# Patient Record
Sex: FEMALE | Race: ASIAN | NOT HISPANIC OR LATINO | Employment: FULL TIME | URBAN - METROPOLITAN AREA
[De-identification: names, ages, dates, MRNs, and addresses within clinical notes are randomized per-mention and may not be internally consistent; named-entity substitution may affect disease eponyms.]

---

## 2020-06-01 ENCOUNTER — NURSE TRIAGE (OUTPATIENT)
Dept: NURSING | Facility: CLINIC | Age: 20
End: 2020-06-01

## 2020-06-01 NOTE — TELEPHONE ENCOUNTER
Patient calls stating she just moved to [place] and parents go to a [place] clinic.   She has been having problems with an itchy scalp that bleeds.   She notes white flaky chunks of skin that can be the size of a pin up to pencil eraser size.   Has not noticed any insects on scalp.   Itching is very bothersome and becomes painful.   Transferred to scheduling to make a virtual visit at [facility] per patient request.     GENEVIEVE Tyler RN      Additional Information    Negative: Sounds like a life-threatening emergency to the triager    Nursing judgment    Protocols used: NO PROTOCOL AVAILABLE - SICK ADULT-A-OH

## 2020-06-08 ENCOUNTER — OFFICE VISIT (OUTPATIENT)
Dept: FAMILY MEDICINE | Facility: CLINIC | Age: 20
End: 2020-06-08
Payer: COMMERCIAL

## 2020-06-08 VITALS
OXYGEN SATURATION: 98 % | HEART RATE: 95 BPM | HEIGHT: 64 IN | WEIGHT: 131 LBS | DIASTOLIC BLOOD PRESSURE: 63 MMHG | RESPIRATION RATE: 16 BRPM | BODY MASS INDEX: 22.36 KG/M2 | TEMPERATURE: 97.8 F | SYSTOLIC BLOOD PRESSURE: 102 MMHG

## 2020-06-08 DIAGNOSIS — Z30.09 COUNSELING FOR BIRTH CONTROL, ORAL CONTRACEPTIVES: ICD-10-CM

## 2020-06-08 DIAGNOSIS — L21.9 SEBORRHEIC DERMATITIS: Primary | ICD-10-CM

## 2020-06-08 DIAGNOSIS — L70.8 OTHER ACNE: ICD-10-CM

## 2020-06-08 LAB — HCG UR QL: NEGATIVE

## 2020-06-08 PROCEDURE — 99203 OFFICE O/P NEW LOW 30 MIN: CPT | Performed by: PHYSICIAN ASSISTANT

## 2020-06-08 PROCEDURE — 81025 URINE PREGNANCY TEST: CPT | Performed by: PHYSICIAN ASSISTANT

## 2020-06-08 RX ORDER — NORGESTIMATE AND ETHINYL ESTRADIOL 0.25-0.035
1 KIT ORAL DAILY
Qty: 84 TABLET | Refills: 3 | Status: SHIPPED | OUTPATIENT
Start: 2020-06-08 | End: 2021-06-11

## 2020-06-08 RX ORDER — CLOBETASOL PROPIONATE 0.5 MG/ML
SOLUTION TOPICAL DAILY
Qty: 50 ML | Refills: 1 | Status: SHIPPED | OUTPATIENT
Start: 2020-06-08 | End: 2022-01-04

## 2020-06-08 RX ORDER — KETOCONAZOLE 20 MG/ML
SHAMPOO TOPICAL DAILY PRN
Qty: 120 ML | Refills: 1 | Status: SHIPPED | OUTPATIENT
Start: 2020-06-08 | End: 2020-12-11

## 2020-06-08 ASSESSMENT — MIFFLIN-ST. JEOR: SCORE: 1346.27

## 2020-06-08 NOTE — PATIENT INSTRUCTIONS
Start birth control after period ends.    Use shampoo when you wash hair and solution once daily until itching/flakey is better.  Patient Education     Understanding Seborrheic Dermatitis  Seborrheic dermatitis is a common type of rash that causes red, scaly, greasy skin. It occurs on skin that has oil glands. These include the face, upper chest, and scalp, where it is often called dandruff. It tends to last a long time, or go away and come back. Seborrheic dermatitis is not spread from person to person.   How to say it  [id number]  What causes seborrheic dermatitis?  Experts don't know what causes it. It may be partly caused by a type of yeast that grows on skin, along with extra oil production. Experts are still learning more. It s more common in men than women, and it can occur at any age. It happens more often in people with HIV/AIDS, Parkinson disease, alcoholic pancreatitis, hepatitis, or cancer. It can also get worse during times of stress.  Symptoms of seborrheic dermatitis  Symptoms can include skin that is:    Bumpy    Covered with yellow scales or crusts    Cracked    Greasy    Itchy    Leaking fluid    Painful    Red or orange  These symptoms can occur on skin:    Around the nose    Behind the ears    In the beard    In the eyebrows    On the scalp, also known as dandruff    On the upper chest  You may also have acne, inflamed eyelids (blepharitis), or other skin conditions at the same time.  Treatment for seborrheic dermatitis  Treatment can reduce symptoms for a period of time. The types of treatments most often used include:    Antifungal shampoo, body wash, or cream. These contain medicines such as ketoconazole, fluconazole, selenium sulfide, ciclopirox, pyrithione zinc, or tea tree oil.    Corticosteroid cream or ointment. These contain medicines such as hydrocortisone.    Calcineurin inhibitor cream or ointment. These contain medicines such as pimecrolimus or  tacrolimus.    Shampoo or cream with other medicines. These contain medicines such as coal tar, salicylic acid, or zinc pyrithione.    Sodium sulfacetemide creams and washes. These may also help.  In some cases one treatment will stop working after a while. Switching between treatments every few months may be helpful.   Wash your skin gently. You can remove scales with oil and gentle rubbing or a brush.  Living with seborrheic dermatitis  Seborrheic dermatitis is an ongoing (chronic) condition. It can go away and then come back. You will likely need to use shampoo, cream, or ointment with medicine once or twice a week. This can help to keep symptoms from coming back or getting worse.  When to call your healthcare provider  Call your healthcare provider right away if you have any of these:    Symptoms that don t get better, or get worse    New symptoms  Date Last Reviewed: 5/1/2016 2000-2019 [employer]. [address] [place] [address], [place] [address]. All rights reserved. This information is not intended as a substitute for professional medical care. Always follow your healthcare professional's instructions.

## 2020-06-08 NOTE — PROGRESS NOTES
"Subjective     Earle Eldridge is a 19 year old female who presents to clinic today for the following health issues:    HPI   Concern - Scalp problem   Onset: ongoing 1 year (has been going on for years prior to this)    Description:   Dry and itchy scalp (sometimes affects eyebrows)    Intensity: severe for itching     Progression of Symptoms:  worsening    Accompanying Signs & Symptoms:  Flaky and bleeding     Previous history of similar problem:   Hx dandruff - has used a medical shampoo (ketoconazole) in the past (maybe helped)    Precipitating factors:   Worsened by: hot and cold weather     Patient has been on birth control pills in the past. She notes she has used it for hormonal acne. She has a history of migraine no aura. She does not smoke regularly (but does occasionally). She also notes that she has \"significant\" cramping with her period and the birth control is also helpful with controlling this. She is on her period currently. She was last sexually active 1.5 months ago.    Patient has recently moved to the area and would like to establish care.    Reviewed and updated as needed this visit by Provider  Tobacco  Allergies  Meds  Problems  Med Hx  Soc Hx        Review of Systems   GENERAL:  No fevers  SKIN: As noted in HPI          Objective    /63 (BP Location: Right arm, Patient Position: Chair, Cuff Size: Adult Regular)   Pulse 95   Temp 97.8  F (36.6  C) (Oral)   Resp 16   Ht 1.613 m (5' 3.5\")   Wt 59.4 kg (131 lb)   SpO2 98%   BMI 22.84 kg/m    Body mass index is 22.84 kg/m .  Physical Exam   GENERAL: No acute distress  HEENT: Normocephalic  SKIN: Patches of dry scales without raised plaques or erythema over the scalp, especially along hairline.   NEURO: Alert and non-focal      Diagnostic Test Results:  Results for orders placed or performed in visit on 06/08/20 (from the past 24 hour(s))   HCG qualitative urine   Result Value Ref Range    HCG Qual Urine Negative NEG^Negative       "     Assessment & Plan     1. Seborrheic dermatitis  Patient's exam is consistent with seborrheic dermatitis. There are no raised erythematous plaques to suggest psoriasis. She has used ketoconazole shampoo in the past, unsure if helpful.  Patient prescribed ketoconazole shampoo along with the topical clobetasol solution.  I did caution patient that the course of seborrheic dermatitis is waxing and waning.  It will worsen at times and improve that others.  She can use the treatments as needed when she has a flare of her symptoms.  - ketoconazole (NIZORAL) 2 % external shampoo; Apply topically daily as needed for itching or irritation  Dispense: 120 mL; Refill: 1  - clobetasol (TEMOVATE) 0.05 % external solution; Apply topically daily On scalp, until improved. No longer than 4 weeks  Dispense: 50 mL; Refill: 1    2. Counseling for birth control, oral contraceptives  Patient is requesting birth control prescription today.  She has used oral birth control in the past.  She is currently on her menstrual period.  Urine pregnancy test obtained.  We will call patient only if urine pregnancy test is positive.  She can start oral birth control once her menstrual period has ended.  - HCG qualitative urine  - norgestimate-ethinyl estradiol (ORTHO-CYCLEN) 0.25-35 MG-MCG tablet; Take 1 tablet by mouth daily  Dispense: 84 tablet; Refill: 3    3. Other acne  As noted above.     See Patient Instructions    Return in about 1 week (around 6/15/2020) for establish care- discuss conccerns.    Mouna Koch PA-C  [facility]

## 2020-08-07 ENCOUNTER — OFFICE VISIT (OUTPATIENT)
Dept: FAMILY MEDICINE | Facility: CLINIC | Age: 20
End: 2020-08-07
Payer: COMMERCIAL

## 2020-08-07 VITALS
WEIGHT: 135 LBS | DIASTOLIC BLOOD PRESSURE: 70 MMHG | TEMPERATURE: 98.1 F | SYSTOLIC BLOOD PRESSURE: 110 MMHG | HEART RATE: 96 BPM | BODY MASS INDEX: 23.54 KG/M2 | RESPIRATION RATE: 14 BRPM

## 2020-08-07 DIAGNOSIS — L71.0 PERIORAL DERMATITIS: ICD-10-CM

## 2020-08-07 DIAGNOSIS — L21.9 SEBORRHEIC DERMATITIS: ICD-10-CM

## 2020-08-07 DIAGNOSIS — L71.9 ACNE ROSACEA: Primary | ICD-10-CM

## 2020-08-07 PROCEDURE — 99213 OFFICE O/P EST LOW 20 MIN: CPT | Performed by: FAMILY MEDICINE

## 2020-08-07 RX ORDER — DOXYCYCLINE HYCLATE 50 MG/1
50 CAPSULE ORAL 2 TIMES DAILY
Qty: 60 CAPSULE | Refills: 1 | Status: SHIPPED | OUTPATIENT
Start: 2020-08-07 | End: 2020-09-06

## 2020-08-07 ASSESSMENT — PATIENT HEALTH QUESTIONNAIRE - PHQ9
10. IF YOU CHECKED OFF ANY PROBLEMS, HOW DIFFICULT HAVE THESE PROBLEMS MADE IT FOR YOU TO DO YOUR WORK, TAKE CARE OF THINGS AT HOME, OR GET ALONG WITH OTHER PEOPLE: EXTREMELY DIFFICULT
SUM OF ALL RESPONSES TO PHQ QUESTIONS 1-9: 20
SUM OF ALL RESPONSES TO PHQ QUESTIONS 1-9: 20

## 2020-08-07 NOTE — PROGRESS NOTES
Subjective          Earle Eldridge is a 19 year old female who presents to clinic today for the following health issues:    History of Present Illness        She eats 4 or more servings of fruits and vegetables daily.She consumes 4 sweetened beverage(s) daily.She exercises with enough effort to increase her heart rate 9 or less minutes per day.  She exercises with enough effort to increase her heart rate 3 or less days per week.   She is taking medications regularly.        Answers for HPI/ROS submitted by the patient on 8/7/2020   Chronic problems general questions HPI Form  If you checked off any problems, how difficult have these problems made it for you to do your work, take care of things at home, or get along with other people?: Extremely difficult  PHQ9 TOTAL SCORE: 20      Rash      Duration: on and off x1 year    Description  Location: face  Itching: no    Intensity:  Burning feeling    Accompanying signs and symptoms: None    History (similar episodes/previous evaluation): yes    Precipitating or alleviating factors:  New exposures:  None  Recent travel: no      Therapies tried and outcome: moisturizer    Physician HPI: Patient is seen for chief concerns of rash areas around her nose and chin.  She recalls being treated in the past using a topical gel, which irritated her skin.  She very much would like to avoid using any topical medication for her face after this experience.  She cannot recall what medication this was.  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  She moved to [place] from the [facility].    The treatment given for her seborrheic dermatitis seems to be helping her.  She uses the ketoconazole shampoo regularly, and for flareups she uses the clobetasol solution.    She would like a referral to a dermatologist to address her scalp and facial skin problems, which I felt was reasonable.    She told me the skin  irritation around her nose and chin is actually better today from the flareups she occasionally gets.  She describes the irritation in her nasolabial areas and perioral area to be like a red rash.    Reviewed and updated as needed this visit by Provider         Review of Systems   See history of present illness.      Objective    /70 (BP Location: Right arm, Patient Position: Chair, Cuff Size: Adult Regular)   Pulse 96   Temp 98.1  F (36.7  C) (Oral)   Resp 14   Wt 61.2 kg (135 lb)   BMI 23.54 kg/m    Body mass index is 23.54 kg/m .  Physical Exam   Vital signs reviewed.  Patient is in no acute appearing distress.  Breathing appears nonlabored.  Patient is alert and oriented ×3.  Scalp exam: Patient has moderate dry white flaking of her scalp, with no scabbing or liquid drainage noted.    Facial exam: Patient has mild pustular acne in her perioral area, and mild nonpustular acne in her malar area of face.          Assessment & Plan   Assessment  1.  Acne rosacea  2.  Perioral dermatitis  3.  Seborrheic dermatitis    Plan  We will start treatment using doxycycline for both the acne rosacea and perioral dermatitis.  Referral given for her to see a dermatology specialist also.      Patient Instructions     Patient Education     What Is Rosacea?  Rosacea is a long-term (chronic) skin disease that causes facial redness. Rosacea appears mainly in adults. Light-skinned people who tend to flush are most often affected. It may be made worse by the following:    Sun exposure    Heat    Eating spicy foods    Drinking alcohol    Getting embarrassed  Rosacea is rarely a health risk. But the symptoms of this disease can be painful and hurt your self-image. If you have rosacea, know that treatment and self-care can help.  A disease with changing symptoms  No one knows what causes rosacea. Heredity, flushing, and the presence of mites or bacteria may play a role. Increased blood flow in the facial skin may be involved.  So may the use of some medicines. Rosacea has 4 main types of symptoms that affect the skin. They are described below. Some people with rosacea also have problems with their eyes. Your eyelids may be red, swollen, or itchy. You may feel as though there is sand in your eyes. From day to day, symptoms can come and go. They may worsen or improve. They can usually be managed with proper treatment and self-care.  Symptoms of rosacea  Flushing  The central region of the face often flushes. This includes the cheeks, chin, forehead, and nose. The color can range from pink to red. Flushing can often include a burning feeling in the skin, rather than itching. The flushing can come and go. Alcohol or hot drinks can make flushing worse. There are few good treatments for those who have only flushing as the main symptoms of their rosacea. So avoiding triggers is the key.   Dilated blood vessels  Tiny enlarged (dilated) blood vessels (telangiectasia) may form a weblike pattern on 1 or more parts of the face.  Acnelike lesions  Acnelike lesions appear on the face. They are called papules, pustules, and nodules, and they tend to occur above the nose, on the cheeks, and on the chin. They may come and go. Facial redness and tiny dilated blood vessels tend to persist.  Enlargement of the nose  With severe rosacea, redness and swelling may enlarge the nose (rhinophyma) due to thickening of the skin. Thickened skin may also occur on the forehead, chin, cheeks, and ears. This occurs most often in men.  Date Last Reviewed: 2/1/2017 2000-2019 [employer]. [address] [place] [address], [place] [address]. All rights reserved. This information is not intended as a substitute for professional medical care. Always follow your healthcare professional's instructions.               Return in about 1 year (around 8/7/2021) for Preventive Visit.    Javon Paredes,   [facility]

## 2020-08-07 NOTE — PATIENT INSTRUCTIONS
Patient Education     What Is Rosacea?  Rosacea is a long-term (chronic) skin disease that causes facial redness. Rosacea appears mainly in adults. Light-skinned people who tend to flush are most often affected. It may be made worse by the following:    Sun exposure    Heat    Eating spicy foods    Drinking alcohol    Getting embarrassed  Rosacea is rarely a health risk. But the symptoms of this disease can be painful and hurt your self-image. If you have rosacea, know that treatment and self-care can help.  A disease with changing symptoms  No one knows what causes rosacea. Heredity, flushing, and the presence of mites or bacteria may play a role. Increased blood flow in the facial skin may be involved. So may the use of some medicines. Rosacea has 4 main types of symptoms that affect the skin. They are described below. Some people with rosacea also have problems with their eyes. Your eyelids may be red, swollen, or itchy. You may feel as though there is sand in your eyes. From day to day, symptoms can come and go. They may worsen or improve. They can usually be managed with proper treatment and self-care.  Symptoms of rosacea  Flushing  The central region of the face often flushes. This includes the cheeks, chin, forehead, and nose. The color can range from pink to red. Flushing can often include a burning feeling in the skin, rather than itching. The flushing can come and go. Alcohol or hot drinks can make flushing worse. There are few good treatments for those who have only flushing as the main symptoms of their rosacea. So avoiding triggers is the key.   Dilated blood vessels  Tiny enlarged (dilated) blood vessels (telangiectasia) may form a weblike pattern on 1 or more parts of the face.  Acnelike lesions  Acnelike lesions appear on the face. They are called papules, pustules, and nodules, and they tend to occur above the nose, on the cheeks, and on the chin. They may come and go. Facial redness and tiny  dilated blood vessels tend to persist.  Enlargement of the nose  With severe rosacea, redness and swelling may enlarge the nose (rhinophyma) due to thickening of the skin. Thickened skin may also occur on the forehead, chin, cheeks, and ears. This occurs most often in men.  Date Last Reviewed: 2/1/2017 2000-2019 [employer]. [address] [place] [address]. All rights reserved. This information is not intended as a substitute for professional medical care. Always follow your healthcare professional's instructions.

## 2020-08-08 ASSESSMENT — PATIENT HEALTH QUESTIONNAIRE - PHQ9: SUM OF ALL RESPONSES TO PHQ QUESTIONS 1-9: 20

## 2020-12-11 DIAGNOSIS — L21.9 SEBORRHEIC DERMATITIS: ICD-10-CM

## 2020-12-11 RX ORDER — KETOCONAZOLE 20 MG/ML
SHAMPOO TOPICAL DAILY PRN
Qty: 120 ML | Refills: 1 | Status: SHIPPED | OUTPATIENT
Start: 2020-12-11 | End: 2021-05-27

## 2020-12-11 NOTE — TELEPHONE ENCOUNTER
Pt is requesting a refill of shampoo.  She would like a call back when this has been approved/denied.  Rx and pharmacy t'd up.  Alisson Bunch, CMA

## 2020-12-11 NOTE — TELEPHONE ENCOUNTER
Prescription approved per [facility] [place]ill Protocol.    Bisi Arellano RN

## 2021-05-26 DIAGNOSIS — L21.9 SEBORRHEIC DERMATITIS: ICD-10-CM

## 2021-05-27 RX ORDER — KETOCONAZOLE 20 MG/ML
SHAMPOO TOPICAL DAILY PRN
Qty: 120 ML | Refills: 1 | Status: SHIPPED | OUTPATIENT
Start: 2021-05-27 | End: 2022-01-05

## 2021-05-27 NOTE — TELEPHONE ENCOUNTER
Prescription approved per [facility] Refill Protocol.  Jayro Edmond RN, BSN

## 2021-06-11 DIAGNOSIS — Z30.09 COUNSELING FOR BIRTH CONTROL, ORAL CONTRACEPTIVES: ICD-10-CM

## 2021-06-11 RX ORDER — NORGESTIMATE AND ETHINYL ESTRADIOL 0.25-0.035
1 KIT ORAL DAILY
Qty: 84 TABLET | Refills: 0 | Status: SHIPPED | OUTPATIENT
Start: 2021-06-11 | End: 2024-06-07

## 2021-08-19 DIAGNOSIS — Z30.09 COUNSELING FOR BIRTH CONTROL, ORAL CONTRACEPTIVES: ICD-10-CM

## 2021-08-20 RX ORDER — NORGESTIMATE AND ETHINYL ESTRADIOL 0.25-0.035
1 KIT ORAL DAILY
Qty: 84 TABLET | Refills: 0 | OUTPATIENT
Start: 2021-08-20

## 2021-08-20 NOTE — TELEPHONE ENCOUNTER
Routing refill request to provider for review/approval because:  Roxanna given x1 and patient did not follow up, please advise  Patient needs to be seen because it has been more than 1 year since last office visit.    Casey BORGES RN

## 2021-09-06 ENCOUNTER — NURSE TRIAGE (OUTPATIENT)
Dept: NURSING | Facility: CLINIC | Age: 21
End: 2021-09-06

## 2021-09-06 NOTE — TELEPHONE ENCOUNTER
"Burning sensation \"down there\", yesterday wore jeans and saw mild rash today.     Ripping feeling in vagina for months, burning is more recent. \"Really bad\" at times. Denies injury or trauma. Painful with penetration or spreading legs wide.   FNA advised being seen today. Patient is unable to pay and insurance coverage isn't good. Given numbers to two free/sliding scale networks. Also advised to call the customer number on the back of her insurance card to see what's in network.  Advised to call back if symptoms worsen.   Patient voiced understanding and will follow disposition but may wait since she refuses ER.     Madhuri Rider RN  [facility] Nurse Advisor            Reason for Disposition    [1] MILD pain AND [2] lasts > 1 day    Additional Information    Negative: Followed a genital area injury    Negative: Symptoms could be from sexual assault    Negative: Pain or burning with passing urine (urination) is main symptom    Negative: Vaginal discharge is main symptom    Negative: Pubic lice suspected    Negative: Pregnant    Negative: Patient sounds very sick or weak to the triager    Negative: [1] SEVERE pain AND [2] not improved 2 hours after pain medicine    Negative: [1] Genital area looks infected (e.g., draining sore, spreading redness) AND [2] fever    Negative: [1] Major bleeding (e.g., actively dripping or spurting) AND [2] can't be stopped    Negative: Shock suspected (e.g., cold/pale/clammy skin, too weak to stand, low BP, rapid pulse)    Negative: Sounds like a life-threatening emergency to the triager    Negative: Known or suspected sexual assault (rape)    Negative: Pulled groin muscle    Negative: Wound looks infected    Negative: Vaginal foreign body is main concern    Negative: [1] MODERATE-SEVERE pain AND [2] lasts > 1 hour    Negative: Bleeding from inside the vagina (Exception: bleeding is definitely from menstrual period)    Negative: Bleeding from inside the rectum    Negative: Skin is split open or " gaping (or length > 1/2 inch or 12 mm)    Negative: [1] External bleeding AND [2] won't stop after 10 minutes of direct pressure    Negative: Pain or burning with passing urine (urination)    Negative: Can't urinate or difficulty passing urine    Negative: Blood in urine (red, pink, or tea-colored)    Negative: [1] Abdominal pain AND [2] after rectal or vaginal penetration (e.g., penis, foreign body)    Negative: Sounds like a serious injury to the triager    Protocols used: GENITAL INJURY - FEMALE-A-AH, VULVAR SYMPTOMS-A-AH

## 2022-01-04 DIAGNOSIS — L21.9 SEBORRHEIC DERMATITIS: ICD-10-CM

## 2022-01-04 RX ORDER — CLOBETASOL PROPIONATE 0.5 MG/ML
SOLUTION TOPICAL DAILY
Qty: 50 ML | Refills: 1 | Status: SHIPPED | OUTPATIENT
Start: 2022-01-04 | End: 2022-06-14

## 2022-01-04 NOTE — TELEPHONE ENCOUNTER
Routing refill request to provider for review/approval because:  Drug not on the [facility] refill protocol     Mechelle Reaves RN on 1/4/2022 at 12:20 PM

## 2022-03-15 ENCOUNTER — APPOINTMENT (OUTPATIENT)
Dept: URBAN - METROPOLITAN AREA CLINIC 255 | Age: 22
Setting detail: DERMATOLOGY
End: 2022-03-19

## 2022-03-15 VITALS — WEIGHT: 127 LBS | HEIGHT: 63 IN

## 2022-03-15 DIAGNOSIS — L21.8 OTHER SEBORRHEIC DERMATITIS: ICD-10-CM

## 2022-03-15 DIAGNOSIS — R21 RASH AND OTHER NONSPECIFIC SKIN ERUPTION: ICD-10-CM

## 2022-03-15 PROCEDURE — 99204 OFFICE O/P NEW MOD 45 MIN: CPT | Mod: 25

## 2022-03-15 PROCEDURE — OTHER MIPS QUALITY: OTHER

## 2022-03-15 PROCEDURE — OTHER PRESCRIPTION: OTHER

## 2022-03-15 PROCEDURE — 11104 PUNCH BX SKIN SINGLE LESION: CPT

## 2022-03-15 PROCEDURE — OTHER BIOPSY BY PUNCH METHOD: OTHER

## 2022-03-15 PROCEDURE — OTHER ORDER TESTS: OTHER

## 2022-03-15 PROCEDURE — 11105 PUNCH BX SKIN EA SEP/ADDL: CPT

## 2022-03-15 PROCEDURE — OTHER COUNSELING: OTHER

## 2022-03-15 RX ORDER — FLUOCINOLONE ACETONIDE 0.11 MG/ML
OIL TOPICAL
Qty: 118.28 | Refills: 3 | Status: ERX | COMMUNITY
Start: 2022-03-15

## 2022-03-15 RX ORDER — KETOCONAZOLE 20 MG/ML
SHAMPOO, SUSPENSION TOPICAL QD
Qty: 120 | Refills: 10 | Status: ERX | COMMUNITY
Start: 2022-03-15

## 2022-03-15 RX ORDER — TACROLIMUS 1 MG/G
OINTMENT TOPICAL BID
Qty: 30 | Refills: 3 | Status: ERX | COMMUNITY
Start: 2022-03-15

## 2022-03-15 RX ORDER — TRIAMCINOLONE ACETONIDE 1 MG/G
CREAM TOPICAL BID
Qty: 454 | Refills: 4 | Status: ERX | COMMUNITY
Start: 2022-03-15

## 2022-03-15 ASSESSMENT — LOCATION DETAILED DESCRIPTION DERM
LOCATION DETAILED: RIGHT POSTERIOR SHOULDER
LOCATION DETAILED: LEFT ANTERIOR EARLOBE
LOCATION DETAILED: MID-FRONTAL SCALP

## 2022-03-15 ASSESSMENT — LOCATION SIMPLE DESCRIPTION DERM
LOCATION SIMPLE: RIGHT SHOULDER
LOCATION SIMPLE: ANTERIOR SCALP
LOCATION SIMPLE: LEFT EAR

## 2022-03-15 ASSESSMENT — LOCATION ZONE DERM
LOCATION ZONE: ARM
LOCATION ZONE: SCALP
LOCATION ZONE: EAR

## 2022-03-15 NOTE — PROCEDURE: BIOPSY BY PUNCH METHOD
Anesthesia Volume In Cc (Will Not Render If 0): 3
Hemostasis: None
Dressing: bandage
Epidermal Sutures: 4-0 Prolene
Validate Note Data (See Information Below): Yes
Size Of Lesion In Cm (Optional): 0
Billing Type: Third-Party Bill
Validate Triangulation: No
Anesthesia Type: 1% lidocaine with epinephrine
Notification Instructions: Patient will be notified of biopsy results. However, patient instructed to call the office if not contacted within 2 weeks.
Biopsy Type: DIF
Consent: Written consent was obtained and risks were reviewed including but not limited to scarring, infection, bleeding, scabbing, incomplete removal, nerve damage and allergy to anesthesia.
Home Suture Removal Text: Patient was provided a home suture removal kit and will remove their sutures at home.  If they have any questions or difficulties they will call the office.
Wound Care: Petrolatum
Biopsy Type: H and E
Punch Size In Mm: 4
Detail Level: Detailed
Information: Selecting Yes will display possible errors in your note based on the variables you have selected. This validation is only offered as a suggestion for you. PLEASE NOTE THAT THE VALIDATION TEXT WILL BE REMOVED WHEN YOU FINALIZE YOUR NOTE. IF YOU WANT TO FAX A PRELIMINARY NOTE YOU WILL NEED TO TOGGLE THIS TO 'NO' IF YOU DO NOT WANT IT IN YOUR FAXED NOTE.
Post-Care Instructions: I reviewed with the patient in detail post-care instructions. Patient is to keep the biopsy site dry overnight, and then apply bacitracin twice daily until healed. Patient may apply hydrogen peroxide soaks to remove any crusting.
Suture Removal: 14 days

## 2022-03-17 RX ORDER — HYDROCORTISONE 25 MG/G
CREAM TOPICAL
Qty: 60 | Refills: 3 | Status: ERX | COMMUNITY
Start: 2022-03-17

## 2022-04-01 ENCOUNTER — TELEPHONE (OUTPATIENT)
Dept: FAMILY MEDICINE | Facility: CLINIC | Age: 22
End: 2022-04-01
Payer: COMMERCIAL

## 2022-04-01 ENCOUNTER — APPOINTMENT (OUTPATIENT)
Dept: URBAN - METROPOLITAN AREA CLINIC 255 | Age: 22
Setting detail: DERMATOLOGY
End: 2022-04-05

## 2022-04-01 DIAGNOSIS — L30.8 OTHER SPECIFIED DERMATITIS: ICD-10-CM

## 2022-04-01 DIAGNOSIS — Z48.02 ENCOUNTER FOR REMOVAL OF SUTURES: ICD-10-CM

## 2022-04-01 DIAGNOSIS — L21.8 OTHER SEBORRHEIC DERMATITIS: ICD-10-CM

## 2022-04-01 PROCEDURE — OTHER COUNSELING: OTHER

## 2022-04-01 PROCEDURE — OTHER MIPS QUALITY: OTHER

## 2022-04-01 PROCEDURE — 99214 OFFICE O/P EST MOD 30 MIN: CPT

## 2022-04-01 PROCEDURE — OTHER PRESCRIPTION MEDICATION MANAGEMENT: OTHER

## 2022-04-01 PROCEDURE — OTHER ADDITIONAL NOTES: OTHER

## 2022-04-01 PROCEDURE — OTHER SUTURE REMOVAL (GLOBAL PERIOD): OTHER

## 2022-04-01 ASSESSMENT — LOCATION SIMPLE DESCRIPTION DERM
LOCATION SIMPLE: ANTERIOR SCALP
LOCATION SIMPLE: LEFT EAR
LOCATION SIMPLE: RIGHT SHOULDER

## 2022-04-01 ASSESSMENT — LOCATION ZONE DERM
LOCATION ZONE: ARM
LOCATION ZONE: SCALP
LOCATION ZONE: EAR

## 2022-04-01 ASSESSMENT — LOCATION DETAILED DESCRIPTION DERM
LOCATION DETAILED: LEFT ANTERIOR EARLOBE
LOCATION DETAILED: RIGHT POSTERIOR SHOULDER
LOCATION DETAILED: MID-FRONTAL SCALP

## 2022-04-01 NOTE — TELEPHONE ENCOUNTER
Patient left message requesting a referral for Dermatology-please call her at [phone].    Lorelei Quesada/EVER

## 2022-04-01 NOTE — PROCEDURE: PRESCRIPTION MEDICATION MANAGEMENT
Continue Regimen: Ketoconazole 2 % shampoo QD (Shampoo hair and wash face once daily for seborrheic dermatitis. Lather and let sit 1-2 minutes before rinsing), \\nDerma-Smoothe/FS Scalp Oil 0.01 % (Apply a thin layer to scalp twice weekly for Seborrheic Dermatitis),\\n Protopic 0.1 % topical ointment BID (Apply to affected areas on the face twice daily . Repeat as needed for flares.), and\\n Hydrocortisone 2.5 % topical cream BID for scaling and flaking (Apply to affected areas as needed).
Detail Level: Zone
Render In Strict Bullet Format?: No
Continue Regimen: Continue triamcinolone BID to areas of rash\\nAvoid beef and shrimp

## 2022-04-01 NOTE — PROCEDURE: SUTURE REMOVAL (GLOBAL PERIOD)
Add 20102 Cpt? (Important Note: In 2017 The Use Of 96994 Is Being Tracked By Cms To Determine Future Global Period Reimbursement For Global Periods): no
Detail Level: Detailed

## 2022-04-01 NOTE — TELEPHONE ENCOUNTER
Called patient and advised would need visit as not seen since 8/7/20.  Went went to dermatology and now [employer] is saying she needs a visit.  Discussed can not back date referrals.  Patient states given wrong information then by  at [facility].  Transferred to referral office to verify if correct.  Bisi Arellano RN

## 2022-04-01 NOTE — PROCEDURE: ADDITIONAL NOTES
Detail Level: Simple
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 Patient was counseled and told to follow up for any further treatment as needed.
Render Risk Assessment In Note?: no

## 2022-05-21 ENCOUNTER — HEALTH MAINTENANCE LETTER (OUTPATIENT)
Age: 22
End: 2022-05-21

## 2022-06-13 DIAGNOSIS — L21.9 SEBORRHEIC DERMATITIS: ICD-10-CM

## 2022-06-14 RX ORDER — CLOBETASOL PROPIONATE 0.5 MG/ML
SOLUTION TOPICAL
Qty: 50 ML | Refills: 1 | Status: SHIPPED | OUTPATIENT
Start: 2022-06-14 | End: 2024-06-07

## 2022-09-18 ENCOUNTER — HEALTH MAINTENANCE LETTER (OUTPATIENT)
Age: 22
End: 2022-09-18

## 2023-04-08 NOTE — TELEPHONE ENCOUNTER
Prescription approved per [facility] Refill Protocol.  Leslie Scherer, RN, BSN, PHN  [facility]      
Attending Only

## 2023-06-04 ENCOUNTER — HEALTH MAINTENANCE LETTER (OUTPATIENT)
Age: 23
End: 2023-06-04

## 2023-06-15 ENCOUNTER — LAB REQUISITION (OUTPATIENT)
Dept: LAB | Facility: CLINIC | Age: 23
End: 2023-06-15

## 2023-06-15 DIAGNOSIS — Z11.8 ENCOUNTER FOR SCREENING FOR OTHER INFECTIOUS AND PARASITIC DISEASES: ICD-10-CM

## 2023-06-15 DIAGNOSIS — Z01.419 ENCOUNTER FOR GYNECOLOGICAL EXAMINATION (GENERAL) (ROUTINE) WITHOUT ABNORMAL FINDINGS: ICD-10-CM

## 2023-06-15 PROCEDURE — G0145 SCR C/V CYTO,THINLAYER,RESCR: HCPCS | Performed by: OBSTETRICS & GYNECOLOGY

## 2023-06-15 PROCEDURE — 87591 N.GONORRHOEAE DNA AMP PROB: CPT | Performed by: OBSTETRICS & GYNECOLOGY

## 2023-06-16 LAB
C TRACH DNA SPEC QL PROBE+SIG AMP: NEGATIVE
N GONORRHOEA DNA SPEC QL NAA+PROBE: NEGATIVE

## 2023-06-19 LAB
BKR LAB AP GYN ADEQUACY: NORMAL
BKR LAB AP GYN INTERPRETATION: NORMAL
BKR LAB AP HPV REFLEX: NORMAL
BKR LAB AP LMP: NORMAL
BKR LAB AP PREVIOUS ABNL DX: NORMAL
BKR LAB AP PREVIOUS ABNORMAL: NORMAL
PATH REPORT.COMMENTS IMP SPEC: NORMAL
PATH REPORT.COMMENTS IMP SPEC: NORMAL
PATH REPORT.RELEVANT HX SPEC: NORMAL

## 2023-06-29 DIAGNOSIS — L21.9 SEBORRHEIC DERMATITIS: ICD-10-CM

## 2023-06-29 RX ORDER — CLOBETASOL PROPIONATE 0.5 MG/ML
SOLUTION TOPICAL
Qty: 50 ML | Refills: 1 | OUTPATIENT
Start: 2023-06-29

## 2023-06-29 NOTE — TELEPHONE ENCOUNTER
Please advise patient that she should be seen at least yearly by me or other primary care provider for safe medication management.  Please facilitate make an appointment for her to be seen if she wishes.  Medication refill was denied.  Also please advise her that I know longer works at the [employer] [facility] [employer].  Javon Paredes, DO on 6/29/2023 at 1:08 PM

## 2023-06-29 NOTE — LETTER
June 30, 2023      Earle Eldridge  [address] [facility] [id number]        Earle Eldridge      We recently received a refill request for your CLOBETASOL PROPIONATE 0.05% SOLN.     Our records show you are due for a follow up visit with your provider.     Please call the clinic at [phone] to schedule as the providers are booking out.          Sincerely,        Rhina Knox/

## 2024-06-07 ENCOUNTER — OFFICE VISIT (OUTPATIENT)
Dept: FAMILY MEDICINE | Facility: CLINIC | Age: 24
End: 2024-06-07
Payer: COMMERCIAL

## 2024-06-07 VITALS
WEIGHT: 124.3 LBS | DIASTOLIC BLOOD PRESSURE: 60 MMHG | OXYGEN SATURATION: 98 % | TEMPERATURE: 97.8 F | RESPIRATION RATE: 20 BRPM | SYSTOLIC BLOOD PRESSURE: 102 MMHG | BODY MASS INDEX: 22.02 KG/M2 | HEART RATE: 82 BPM | HEIGHT: 63 IN

## 2024-06-07 DIAGNOSIS — Z23 NEED FOR VACCINATION: ICD-10-CM

## 2024-06-07 DIAGNOSIS — L21.9 SEBORRHEIC DERMATITIS: ICD-10-CM

## 2024-06-07 DIAGNOSIS — Z83.3 FH: DIABETES MELLITUS: ICD-10-CM

## 2024-06-07 DIAGNOSIS — Z11.3 SCREENING FOR STDS (SEXUALLY TRANSMITTED DISEASES): ICD-10-CM

## 2024-06-07 DIAGNOSIS — L68.0 HIRSUTISM: ICD-10-CM

## 2024-06-07 DIAGNOSIS — R21 RASH AND NONSPECIFIC SKIN ERUPTION: ICD-10-CM

## 2024-06-07 DIAGNOSIS — Z13.220 ENCOUNTER FOR SCREENING FOR LIPID DISORDER: ICD-10-CM

## 2024-06-07 DIAGNOSIS — Z11.59 NEED FOR HEPATITIS C SCREENING TEST: ICD-10-CM

## 2024-06-07 DIAGNOSIS — Z11.4 SCREENING FOR HIV (HUMAN IMMUNODEFICIENCY VIRUS): ICD-10-CM

## 2024-06-07 DIAGNOSIS — Z00.00 ROUTINE GENERAL MEDICAL EXAMINATION AT A HEALTH CARE FACILITY: Primary | ICD-10-CM

## 2024-06-07 LAB
ERYTHROCYTE [DISTWIDTH] IN BLOOD BY AUTOMATED COUNT: 12.1 % (ref 10–15)
HBA1C MFR BLD: 5.2 % (ref 0–5.6)
HCT VFR BLD AUTO: 36.4 % (ref 35–47)
HGB BLD-MCNC: 12.4 G/DL (ref 11.7–15.7)
MCH RBC QN AUTO: 31.1 PG (ref 26.5–33)
MCHC RBC AUTO-ENTMCNC: 34.1 G/DL (ref 31.5–36.5)
MCV RBC AUTO: 91 FL (ref 78–100)
PLATELET # BLD AUTO: 264 10E3/UL (ref 150–450)
RBC # BLD AUTO: 3.99 10E6/UL (ref 3.8–5.2)
WBC # BLD AUTO: 6.3 10E3/UL (ref 4–11)

## 2024-06-07 PROCEDURE — 90715 TDAP VACCINE 7 YRS/> IM: CPT | Performed by: INTERNAL MEDICINE

## 2024-06-07 PROCEDURE — 90471 IMMUNIZATION ADMIN: CPT | Performed by: INTERNAL MEDICINE

## 2024-06-07 PROCEDURE — 90480 ADMN SARSCOV2 VAC 1/ONLY CMP: CPT | Performed by: INTERNAL MEDICINE

## 2024-06-07 PROCEDURE — 85027 COMPLETE CBC AUTOMATED: CPT | Performed by: INTERNAL MEDICINE

## 2024-06-07 PROCEDURE — 91320 SARSCV2 VAC 30MCG TRS-SUC IM: CPT | Performed by: INTERNAL MEDICINE

## 2024-06-07 PROCEDURE — 84443 ASSAY THYROID STIM HORMONE: CPT | Performed by: INTERNAL MEDICINE

## 2024-06-07 PROCEDURE — 90651 9VHPV VACCINE 2/3 DOSE IM: CPT | Performed by: INTERNAL MEDICINE

## 2024-06-07 PROCEDURE — 36415 COLL VENOUS BLD VENIPUNCTURE: CPT | Performed by: INTERNAL MEDICINE

## 2024-06-07 PROCEDURE — 86803 HEPATITIS C AB TEST: CPT | Performed by: INTERNAL MEDICINE

## 2024-06-07 PROCEDURE — 83036 HEMOGLOBIN GLYCOSYLATED A1C: CPT | Performed by: INTERNAL MEDICINE

## 2024-06-07 PROCEDURE — 83498 ASY HYDROXYPROGESTERONE 17-D: CPT | Performed by: INTERNAL MEDICINE

## 2024-06-07 PROCEDURE — 87591 N.GONORRHOEAE DNA AMP PROB: CPT | Performed by: INTERNAL MEDICINE

## 2024-06-07 PROCEDURE — 82627 DEHYDROEPIANDROSTERONE: CPT | Performed by: INTERNAL MEDICINE

## 2024-06-07 PROCEDURE — 90472 IMMUNIZATION ADMIN EACH ADD: CPT | Performed by: INTERNAL MEDICINE

## 2024-06-07 PROCEDURE — 80053 COMPREHEN METABOLIC PANEL: CPT | Performed by: INTERNAL MEDICINE

## 2024-06-07 PROCEDURE — 87491 CHLMYD TRACH DNA AMP PROBE: CPT | Performed by: INTERNAL MEDICINE

## 2024-06-07 PROCEDURE — 87389 HIV-1 AG W/HIV-1&-2 AB AG IA: CPT | Performed by: INTERNAL MEDICINE

## 2024-06-07 PROCEDURE — 99385 PREV VISIT NEW AGE 18-39: CPT | Mod: 25 | Performed by: INTERNAL MEDICINE

## 2024-06-07 PROCEDURE — 99215 OFFICE O/P EST HI 40 MIN: CPT | Mod: 25 | Performed by: INTERNAL MEDICINE

## 2024-06-07 PROCEDURE — 80061 LIPID PANEL: CPT | Performed by: INTERNAL MEDICINE

## 2024-06-07 RX ORDER — KETOCONAZOLE 20 MG/ML
SHAMPOO TOPICAL DAILY PRN
Qty: 120 ML | Refills: 5 | Status: SHIPPED | OUTPATIENT
Start: 2024-06-07

## 2024-06-07 RX ORDER — CLOBETASOL PROPIONATE 0.5 MG/G
CREAM TOPICAL 2 TIMES DAILY
Qty: 60 G | Refills: 1 | Status: SHIPPED | OUTPATIENT
Start: 2024-06-07

## 2024-06-07 SDOH — HEALTH STABILITY: PHYSICAL HEALTH: ON AVERAGE, HOW MANY MINUTES DO YOU ENGAGE IN EXERCISE AT THIS LEVEL?: 60 MIN

## 2024-06-07 SDOH — HEALTH STABILITY: PHYSICAL HEALTH: ON AVERAGE, HOW MANY DAYS PER WEEK DO YOU ENGAGE IN MODERATE TO STRENUOUS EXERCISE (LIKE A BRISK WALK)?: 5 DAYS

## 2024-06-07 ASSESSMENT — SOCIAL DETERMINANTS OF HEALTH (SDOH): HOW OFTEN DO YOU GET TOGETHER WITH FRIENDS OR RELATIVES?: TWICE A WEEK

## 2024-06-07 ASSESSMENT — PAIN SCALES - GENERAL: PAINLEVEL: NO PAIN (0)

## 2024-06-07 NOTE — PATIENT INSTRUCTIONS
As discussed , please do fasting labs placed.     Will consider additional work up for your concerns on Chin hair as well exclude PCOS.     Refills done for requested medications.     Sent in Clobetasol for facial rash and dermatology referral    Placed US pelvis     [facility]  ( [facility] )   Call : [phone]  Toll Free : [phone]    ==============================    [facility]  ( [place] )   [place] [facility]  Call : [phone]  Toll Free : [phone]    ==============================  [facility] [place]  Phone : [phone]    ===============================    [facility]   ( All Locations )   Call : [phone]  Toll Free : [phone]        =====================================

## 2024-06-07 NOTE — PROGRESS NOTES
Preventive Care Visit  [facility] JOSE Guaman MD, Internal Medicine  Jun 7, 2024        Assessment and Plan  1. Routine general medical examination at a health care facility    Patient becomes new to [place] as she has not seen PCP for the past 3 years.  She is here for annual physical, not on OCPs anymore except scalp shampoos.  No labs done anytime.  Can do baseline labs.    Patient does have new concerns of ongoing hirsutism with nonspecific skin rash on the chin.  Requesting for refills on the shampoos she uses for her scalp dermatitis.      - REVIEW OF HEALTH MAINTENANCE PROTOCOL ORDERS  - TDAP 10-64Y (ADACEL,BOOSTRIX)  - HIV Antigen Antibody Combo; Future  - HPV9 (GARDASIL 9)  - Hepatitis C Screen Reflex to HCV RNA Quant and Genotype; Future  - COVID-19 12+ (2023-24) (PFIZER)  - Chlamydia trachomatis/Neisseria gonorrhoeae by PCR- VAGINAL SELF-SWAB; Future  - ketoconazole (NIZORAL) 2 % external shampoo; Apply topically daily as needed for itching or irritation  Dispense: 120 mL; Refill: 5  - Lipid panel reflex to direct LDL Fasting; Future  - Comprehensive metabolic panel (BMP + Alb, Alk Phos, ALT, AST, Total. Bili, TP); Future  - CBC with platelets; Future  - TSH with free T4 reflex; Future  - HIV Antigen Antibody Combo  - Hepatitis C Screen Reflex to HCV RNA Quant and Genotype  - Chlamydia trachomatis/Neisseria gonorrhoeae by PCR- VAGINAL SELF-SWAB  - Lipid panel reflex to direct LDL Fasting  - Comprehensive metabolic panel (BMP + Alb, Alk Phos, ALT, AST, Total. Bili, TP)  - CBC with platelets  - TSH with free T4 reflex    2. Rash and nonspecific skin eruption  Ongoing problem, uncontrolled with intense itching sometimes as patient endorses. eczematous skin rash upon the ingrown hairs of hirsutism per my exam.  No signs of infection or cellulitis.  -Differential diagnosis of possible eczematous rash/dermatitis due to patient endorses that she has been having waxing beauty  procedure done for removal of her hair on the chin mostly.  -Will treat with a course of steroid topical application, also placed referral to dermatology given the chronicity of the rash for definitive treatment on cosmetic aspect as well.  Patient understood and agreed with the plan.  - clobetasol propionate (TEMOVATE) 0.05 % external cream; Apply topically 2 times daily 2 weeks  Dispense: 60 g; Refill: 1  - Adult Dermatology  Referral; Future    3. Hirsutism  Ongoing problem, uncontrolled.  Patient states that she never had any lab work done for this, shared decision to check pertinent workup needed at this time including thyroid hormones, DHEA, 17 hydroxyprogesterone, testosterone levels.    - Differential diagnosis of PCOS cannot be excluded. Will consider checking pelvic Ultrasound and do further recommendations. Patient understood and agreed with the plan.  - Comprehensive metabolic panel (BMP + Alb, Alk Phos, ALT, AST, Total. Bili, TP); Future  - DHEA sulfate; Future  - 17 OH progesterone; Future  - TSH with free T4 reflex; Future  - US Pelvic Transabdominal and Transvaginal; Future  - Adult Dermatology  Referral; Future  - Comprehensive metabolic panel (BMP + Alb, Alk Phos, ALT, AST, Total. Bili, TP)  - DHEA sulfate  - 17 OH progesterone  - TSH with free T4 reflex  - Testosterone, total; Future    4. Seborrheic dermatitis  Chronic problem, well-controlled with current Nizoral shampoo.  Patient states she might be needing clobetasol shampoo sometimes.  Will go ahead and give Nizoral.  - ketoconazole (NIZORAL) 2 % external shampoo; Apply topically daily as needed for itching or irritation  Dispense: 120 mL; Refill: 5  - CBC with platelets; Future  - CBC with platelets    5. FH: diabetes mellitus  - Hemoglobin A1c; Future  - Hemoglobin A1c    6. Screening for HIV (human immunodeficiency virus)  - HIV Antigen Antibody Combo; Future  - HIV Antigen Antibody Combo    7. Need for hepatitis C  screening test  - Hepatitis C Screen Reflex to HCV RNA Quant and Genotype; Future  - Hepatitis C Screen Reflex to HCV RNA Quant and Genotype    8. Screening for STDs (sexually transmitted diseases)  - Chlamydia trachomatis/Neisseria gonorrhoeae by PCR- VAGINAL SELF-SWAB; Future  - Chlamydia trachomatis/Neisseria gonorrhoeae by PCR- VAGINAL SELF-SWAB    9. Encounter for screening for lipid disorder  - Lipid panel reflex to direct LDL Fasting; Future  - Lipid panel reflex to direct LDL Fasting    10. Need for vaccination  - TDAP 10-64Y (ADACEL,BOOSTRIX)  - HPV9 (GARDASIL 9)  - COVID-19 12+ (2023-24) (PFIZER)         Over 40 minutes spent outside the preventive visit ( 20 minutes )  on reviewing patient chart,  face to face encounter, greater than 50% time spent with plan/cordination of care and documentation as above in my A/P.           Please note that this note consists of symbols derived from keyboarding, dictation and/or voice recognition software. As a result, there may be errors in the script that have gone undetected. Please consider this when interpreting information found in this chart.    Patient Instructions   As discussed , please do fasting labs placed.     Will consider additional work up for your concerns on Chin hair as well exclude PCOS.     Refills done for requested medications.     Sent in Clobetasol for facial rash and dermatology referral    Placed  pelvis     [facility]  ( [facility] )   Call : [phone]  Toll Free : [phone]    ==============================    [facility]  ( [facility] )   [place] [facility]  Call : [phone]  Toll Free : [phone]    ==============================  [facility] [place]  Phone : [phone]    ===============================    [facility]   ( All Locations )   Call : [phone]  Toll Free :  [phone]        =====================================              Return in about 6 months (around 12/7/2024), or if symptoms worsen or fail to improve, for If symptoms persist, Follow up of last visit, video visit.    Jane Guaman MD  [facility] JOSE Og is a 23 year old, presenting for the following:  Physical (Fasting)        6/7/2024    10:55 AM   Additional Questions   Roomed by Ivet TAYLOR        Health Care Directive  Patient does not have a Health Care Directive or Living Will: Discussed advance care planning with patient; however, patient declined at this time.    Healthy Habits:     Getting at least 3 servings of Calcium per day:  NO    Bi-annual eye exam:  NO    Dental care twice a year:  NO    Sleep apnea or symptoms of sleep apnea:  None    Diet:  Regular (no restrictions)    Frequency of exercise:  4-5 days/week    Duration of exercise:  45-60 minutes    Taking medications regularly:  Yes    Barriers to taking medications:  None    Medication side effects:  None    Additional concerns today:  No          6/7/2024   General Health   How would you rate your overall physical health? Excellent   Feel stress (tense, anxious, or unable to sleep) Only a little   (!) STRESS CONCERN      6/7/2024   Nutrition   Three or more servings of calcium each day? (!) I DON'T KNOW   Diet: Regular (no restrictions)   How many servings of fruit and vegetables per day? (!) 0-1   How many sweetened beverages each day? (!) 2         6/7/2024   Exercise   Days per week of moderate/strenous exercise 5 days   Average minutes spent exercising at this level 60 min         6/7/2024   Social Factors   Frequency of gathering with friends or relatives Twice a week   Worry food won't last until get money to buy more No   Food not last or not have enough money for food? No   Do you have housing?  Yes   Are you worried about losing your housing? No   Lack of transportation? No   Unable to  get utilities (heat,electricity)? No         6/7/2024   Dental   Dentist two times every year? (!) NO         6/7/2024   TB Screening   Were you born outside of the [place]? Yes         Today's PHQ-2 Score:       6/7/2024    10:51 AM   PHQ-2 ( 1999 [employer])   Q1: Little interest or pleasure in doing things 1   Q2: Feeling down, depressed or hopeless 1   PHQ-2 Score 2   Q1: Little interest or pleasure in doing things Several days   Q2: Feeling down, depressed or hopeless Several days   PHQ-2 Score 2           6/7/2024   Substance Use   Alcohol more than 3/day or more than 7/wk No   Do you use any other substances recreationally? No     Social History     Tobacco Use    Smoking status: Never    Smokeless tobacco: Former   Vaping Use    Vaping status: Never Used   Substance Use Topics    Alcohol use: Not Currently    Drug use: Never             6/7/2024   One time HIV Screening   Previous HIV test? No         6/7/2024   STI Screening   New sexual partner(s) since last STI/HIV test? (!) YES      History of abnormal Pap smear: No - age 21-29 PAP every 3 years recommended        6/15/2023     4:26 PM   PAP / HPV   PAP Negative for Intraepithelial Lesion or Malignancy (NILM)            6/7/2024   Contraception/Family Planning   Questions about contraception or family planning No        Reviewed and updated as needed this visit by Provider   Tobacco  Allergies  Meds  Problems  Med Hx  Surg Hx  Fam Hx            History reviewed. No pertinent past medical history.  History reviewed. No pertinent surgical history.  OB History   No obstetric history on file.     Lab work is in process  Labs reviewed in EPIC  BP Readings from Last 3 Encounters:   06/07/24 102/60   08/07/20 110/70   06/08/20 102/63    Wt Readings from Last 3 Encounters:   06/07/24 56.4 kg (124 lb 4.8 oz)   08/07/20 61.2 kg (135 lb) (63%, Z= 0.32)*   06/08/20 59.4 kg (131 lb) (57%, Z= 0.16)*     * Growth percentiles are based on [facility] (Girls, 2-20 Years) data.     "              Patient Active Problem List   Diagnosis    Other acne     History reviewed. No pertinent surgical history.    Social History     Tobacco Use    Smoking status: Never    Smokeless tobacco: Former   Substance Use Topics    Alcohol use: Not Currently     History reviewed. No pertinent family history.      Current Outpatient Medications   Medication Sig Dispense Refill    clobetasol propionate (TEMOVATE) 0.05 % external cream Apply topically 2 times daily 2 weeks 60 g 1    ketoconazole (NIZORAL) 2 % external shampoo Apply topically daily as needed for itching or irritation 120 mL 5     No Known Allergies  Recent Labs   Lab Test 06/07/24  1152   A1C 5.2          Review of Systems  Constitutional, HEENT, cardiovascular, pulmonary, GI, , musculoskeletal, neuro, skin, endocrine and psych systems are negative, except as otherwise noted.     Objective    Exam  /60   Pulse 82   Temp 97.8  F (36.6  C)   Resp 20   Ht 1.607 m (5' 3.26\")   Wt 56.4 kg (124 lb 4.8 oz)   LMP 05/20/2024 (Approximate)   SpO2 98%   BMI 21.84 kg/m     Estimated body mass index is 21.84 kg/m  as calculated from the following:    Height as of this encounter: 1.607 m (5' 3.26\").    Weight as of this encounter: 56.4 kg (124 lb 4.8 oz).    Physical Exam  GENERAL: alert and no distress  EYES: Eyes grossly normal to inspection, PERRL and conjunctivae and sclerae normal  HENT: ear canals and TM's normal, nose and mouth without ulcers or lesions  NECK: no adenopathy, no asymmetry, masses, or scars  RESP: lungs clear to auscultation - no rales, rhonchi or wheezes  BREAST: normal without masses, tenderness or nipple discharge and no palpable axillary masses or adenopathy  CV: regular rate and rhythm, normal S1 S2, no S3 or S4, no murmur, click or rub, no peripheral edema  ABDOMEN: soft, nontender, no hepatosplenomegaly, no masses and bowel sounds normal  MS: no gross musculoskeletal defects noted, no edema  SKIN: POSITIVE for hirsutism " on the chin as well as papular dark rash with pruritus on the left lower part of the chin.   NEURO: Normal strength and tone, mentation intact and speech normal  PSYCH: mentation appears normal, affect normal/bright        Signed Electronically by: Jane Guaman MD

## 2024-06-08 LAB
ALBUMIN SERPL BCG-MCNC: 4.2 G/DL (ref 3.5–5.2)
ALP SERPL-CCNC: 65 U/L (ref 40–150)
ALT SERPL W P-5'-P-CCNC: 16 U/L (ref 0–50)
ANION GAP SERPL CALCULATED.3IONS-SCNC: 9 MMOL/L (ref 7–15)
AST SERPL W P-5'-P-CCNC: 53 U/L (ref 0–45)
BILIRUB SERPL-MCNC: 0.3 MG/DL
BUN SERPL-MCNC: 19.2 MG/DL (ref 6–20)
C TRACH DNA SPEC QL PROBE+SIG AMP: NEGATIVE
CALCIUM SERPL-MCNC: 8.9 MG/DL (ref 8.6–10)
CHLORIDE SERPL-SCNC: 103 MMOL/L (ref 98–107)
CHOLEST SERPL-MCNC: 171 MG/DL
CREAT SERPL-MCNC: 0.8 MG/DL (ref 0.51–0.95)
DEPRECATED HCO3 PLAS-SCNC: 24 MMOL/L (ref 22–29)
EGFRCR SERPLBLD CKD-EPI 2021: >90 ML/MIN/1.73M2
FASTING STATUS PATIENT QL REPORTED: ABNORMAL
FASTING STATUS PATIENT QL REPORTED: ABNORMAL
GLUCOSE SERPL-MCNC: 80 MG/DL (ref 70–99)
HCV AB SERPL QL IA: NONREACTIVE
HDLC SERPL-MCNC: 57 MG/DL
HIV 1+2 AB+HIV1 P24 AG SERPL QL IA: NONREACTIVE
LDLC SERPL CALC-MCNC: 106 MG/DL
N GONORRHOEA DNA SPEC QL NAA+PROBE: NEGATIVE
NONHDLC SERPL-MCNC: 114 MG/DL
POTASSIUM SERPL-SCNC: 4 MMOL/L (ref 3.4–5.3)
PROT SERPL-MCNC: 7 G/DL (ref 6.4–8.3)
SODIUM SERPL-SCNC: 136 MMOL/L (ref 135–145)
TRIGL SERPL-MCNC: 42 MG/DL
TSH SERPL DL<=0.005 MIU/L-ACNC: 0.77 UIU/ML (ref 0.3–4.2)

## 2024-06-10 LAB — DHEA-S SERPL-MCNC: 402 UG/DL (ref 35–430)

## 2024-06-20 LAB — 17OHP SERPL-MCNC: 74 NG/DL

## 2024-11-14 ENCOUNTER — TELEPHONE (OUTPATIENT)
Dept: FAMILY MEDICINE | Facility: CLINIC | Age: 24
End: 2024-11-14
Payer: COMMERCIAL

## 2024-11-14 NOTE — TELEPHONE ENCOUNTER
Order/Referral Request    Who is requesting: Earle    Orders being requested: Referral     Reason service is needed/diagnosis: For dermatology    When are orders needed by: ASAP    Has this been discussed with Provider: Yes    Does patient have a preference on a Group/Provider/Facility? [facility]  [phone]    Does patient have an appointment scheduled?: No    Where to send orders: Fax    Could we send this information to you in [employer] or would you prefer to receive a phone call?:   Patient would like to be contacted via [employer]